# Patient Record
Sex: MALE | Race: WHITE
[De-identification: names, ages, dates, MRNs, and addresses within clinical notes are randomized per-mention and may not be internally consistent; named-entity substitution may affect disease eponyms.]

---

## 2017-05-19 ENCOUNTER — HOSPITAL ENCOUNTER (INPATIENT)
Dept: HOSPITAL 4 - SED | Age: 62
LOS: 1 days | Discharge: HOME | DRG: 313 | End: 2017-05-20
Payer: COMMERCIAL

## 2017-05-19 VITALS — HEIGHT: 74 IN | BODY MASS INDEX: 25.41 KG/M2 | WEIGHT: 198 LBS

## 2017-05-19 VITALS — SYSTOLIC BLOOD PRESSURE: 145 MMHG

## 2017-05-19 DIAGNOSIS — K22.70: ICD-10-CM

## 2017-05-19 DIAGNOSIS — F10.229: ICD-10-CM

## 2017-05-19 DIAGNOSIS — R07.89: Primary | ICD-10-CM

## 2017-05-19 DIAGNOSIS — I10: ICD-10-CM
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
## 2017-05-19 DIAGNOSIS — I25.2: ICD-10-CM

## 2017-05-19 DIAGNOSIS — Z79.899: ICD-10-CM

## 2017-05-19 DIAGNOSIS — Z81.1: ICD-10-CM

## 2017-05-19 LAB
ALBUMIN SERPL BCP-MCNC: 3.7 G/DL (ref 3.4–4.8)
ALT SERPL W P-5'-P-CCNC: 35 U/L (ref 12–78)
ANION GAP SERPL CALCULATED.3IONS-SCNC: 14 MMOL/L (ref 5–15)
APAP SERPL-MCNC: < 1 UG/ML (ref 1–30)
AST SERPL W P-5'-P-CCNC: 25 U/L (ref 10–37)
BASOPHILS # BLD AUTO: 0 K/UL (ref 0–0.2)
BASOPHILS NFR BLD AUTO: 0.6 % (ref 0–2)
BILIRUB SERPL-MCNC: 0.3 MG/DL (ref 0–1)
BUN SERPL-MCNC: 10 MG/DL (ref 8–21)
CALCIUM SERPL-MCNC: 8.6 MG/DL (ref 8.4–11)
CHLORIDE SERPL-SCNC: 104 MMOL/L (ref 98–107)
CK SERPL-CCNC: 94 U/L (ref 39–308)
CREAT SERPL-MCNC: 0.91 MG/DL (ref 0.55–1.3)
EOSINOPHIL # BLD AUTO: 0 K/UL (ref 0–0.4)
EOSINOPHIL NFR BLD AUTO: 0.7 % (ref 0–4)
ERYTHROCYTE [DISTWIDTH] IN BLOOD BY AUTOMATED COUNT: 18.7 % (ref 9–15)
ETHANOL SERPL-MCNC: 341 MG/DL (ref ?–10)
GFR SERPL CREATININE-BSD FRML MDRD: 109 ML/MIN (ref 90–?)
GLUCOSE SERPL-MCNC: 102 MG/DL (ref 70–99)
HCT VFR BLD AUTO: 41.6 % (ref 36–54)
HGB BLD-MCNC: 13 G/DL (ref 14–18)
INR PPP: 1 (ref 0.8–1.2)
LYMPHOCYTES # BLD AUTO: 1.6 K/UL (ref 1–5.5)
LYMPHOCYTES NFR BLD AUTO: 40.2 % (ref 20.5–51.5)
MCH RBC QN AUTO: 24 PG (ref 27–31)
MCHC RBC AUTO-ENTMCNC: 31 % (ref 32–36)
MCV RBC AUTO: 77 FL (ref 79–98)
MONOCYTES # BLD MANUAL: 0.6 K/UL (ref 0–1)
MONOCYTES # BLD MANUAL: 13.9 % (ref 1.7–9.3)
NEUTROPHILS # BLD AUTO: 1.9 K/UL (ref 1.8–7.7)
NEUTROPHILS NFR BLD AUTO: 44.6 % (ref 40–70)
PLATELET # BLD AUTO: 339 K/UL (ref 130–430)
POTASSIUM SERPL-SCNC: 3.3 MMOL/L (ref 3.5–5.1)
PROT SERPL-MCNC: 8.6 G/DL (ref 6.4–8.3)
PROTHROMBIN TIME: 10.8 SECS (ref 9.5–12.5)
RBC # BLD AUTO: 5.42 MIL/UL (ref 4.2–6.2)
SALICYLATES SERPL-MCNC: 1 MG/DL (ref 3–30)
SODIUM SERPLBLD-SCNC: 145 MMOL/L (ref 136–145)
WBC # BLD AUTO: 4.1 K/UL (ref 4.8–10.8)

## 2017-05-19 PROCEDURE — G0480 DRUG TEST DEF 1-7 CLASSES: HCPCS

## 2017-05-19 PROCEDURE — G0482 DRUG TEST DEF 15-21 CLASSES: HCPCS

## 2017-05-19 PROCEDURE — G0481 DRUG TEST DEF 8-14 CLASSES: HCPCS

## 2017-05-20 VITALS — SYSTOLIC BLOOD PRESSURE: 150 MMHG

## 2017-05-20 VITALS — SYSTOLIC BLOOD PRESSURE: 149 MMHG

## 2017-05-20 VITALS — SYSTOLIC BLOOD PRESSURE: 147 MMHG

## 2017-05-20 VITALS — SYSTOLIC BLOOD PRESSURE: 146 MMHG

## 2017-05-20 VITALS — SYSTOLIC BLOOD PRESSURE: 176 MMHG

## 2017-05-20 LAB
ALBUMIN SERPL BCP-MCNC: 3.5 G/DL (ref 3.4–4.8)
ALT SERPL W P-5'-P-CCNC: 32 U/L (ref 12–78)
ANION GAP SERPL CALCULATED.3IONS-SCNC: 14 MMOL/L (ref 5–15)
AST SERPL W P-5'-P-CCNC: 24 U/L (ref 10–37)
BASOPHILS # BLD AUTO: 0 K/UL (ref 0–0.2)
BASOPHILS NFR BLD AUTO: 1 % (ref 0–2)
BILIRUB SERPL-MCNC: 0.3 MG/DL (ref 0–1)
BUN SERPL-MCNC: 12 MG/DL (ref 8–21)
CALCIUM SERPL-MCNC: 8.5 MG/DL (ref 8.4–11)
CHLORIDE SERPL-SCNC: 103 MMOL/L (ref 98–107)
CREAT SERPL-MCNC: 0.83 MG/DL (ref 0.55–1.3)
EOSINOPHIL # BLD AUTO: 0.1 K/UL (ref 0–0.4)
EOSINOPHIL NFR BLD AUTO: 1.7 % (ref 0–4)
ERYTHROCYTE [DISTWIDTH] IN BLOOD BY AUTOMATED COUNT: 18.6 % (ref 9–15)
GFR SERPL CREATININE-BSD FRML MDRD: 121 ML/MIN (ref 90–?)
GLUCOSE SERPL-MCNC: 84 MG/DL (ref 70–99)
HCT VFR BLD AUTO: 40.5 % (ref 36–54)
HGB BLD-MCNC: 12.6 G/DL (ref 14–18)
LYMPHOCYTES # BLD AUTO: 1.1 K/UL (ref 1–5.5)
LYMPHOCYTES NFR BLD AUTO: 31.5 % (ref 20.5–51.5)
MCH RBC QN AUTO: 24 PG (ref 27–31)
MCHC RBC AUTO-ENTMCNC: 31 % (ref 32–36)
MCV RBC AUTO: 77 FL (ref 79–98)
MONOCYTES # BLD MANUAL: 0.5 K/UL (ref 0–1)
MONOCYTES # BLD MANUAL: 15.5 % (ref 1.7–9.3)
NEUTROPHILS # BLD AUTO: 1.8 K/UL (ref 1.8–7.7)
NEUTROPHILS NFR BLD AUTO: 50.3 % (ref 40–70)
PLATELET # BLD AUTO: 313 K/UL (ref 130–430)
POTASSIUM SERPL-SCNC: 3.4 MMOL/L (ref 3.5–5.1)
PROT SERPL-MCNC: 8.2 G/DL (ref 6.4–8.3)
RBC # BLD AUTO: 5.25 MIL/UL (ref 4.2–6.2)
SODIUM SERPLBLD-SCNC: 144 MMOL/L (ref 136–145)
WBC # BLD AUTO: 3.5 K/UL (ref 4.8–10.8)

## 2017-06-03 ENCOUNTER — HOSPITAL ENCOUNTER (EMERGENCY)
Dept: HOSPITAL 4 - SED | Age: 62
LOS: 1 days | Discharge: HOME | End: 2017-06-04
Payer: COMMERCIAL

## 2017-06-03 VITALS — WEIGHT: 210 LBS | BODY MASS INDEX: 25.57 KG/M2 | HEIGHT: 76 IN

## 2017-06-03 VITALS — SYSTOLIC BLOOD PRESSURE: 138 MMHG

## 2017-06-03 DIAGNOSIS — I25.2: ICD-10-CM

## 2017-06-03 DIAGNOSIS — R79.89: Primary | ICD-10-CM

## 2017-06-03 DIAGNOSIS — Z79.4: ICD-10-CM

## 2017-06-03 LAB
ALBUMIN SERPL BCP-MCNC: 3.9 G/DL (ref 3.4–4.8)
ALT SERPL W P-5'-P-CCNC: 33 U/L (ref 12–78)
ANION GAP SERPL CALCULATED.3IONS-SCNC: 15 MMOL/L (ref 5–15)
AST SERPL W P-5'-P-CCNC: 32 U/L (ref 10–37)
BASOPHILS # BLD AUTO: 0 K/UL (ref 0–0.2)
BASOPHILS NFR BLD AUTO: 0.7 % (ref 0–2)
BILIRUB SERPL-MCNC: 0.3 MG/DL (ref 0–1)
BUN SERPL-MCNC: 13 MG/DL (ref 8–21)
CALCIUM SERPL-MCNC: 8.7 MG/DL (ref 8.4–11)
CHLORIDE SERPL-SCNC: 101 MMOL/L (ref 98–107)
CREAT SERPL-MCNC: 1.1 MG/DL (ref 0.55–1.3)
EOSINOPHIL # BLD AUTO: 0 K/UL (ref 0–0.4)
EOSINOPHIL NFR BLD AUTO: 0.9 % (ref 0–4)
ERYTHROCYTE [DISTWIDTH] IN BLOOD BY AUTOMATED COUNT: 18.7 % (ref 9–15)
ETHANOL SERPL-MCNC: 207 MG/DL (ref ?–10)
GFR SERPL CREATININE-BSD FRML MDRD: 87 ML/MIN (ref 90–?)
GLUCOSE SERPL-MCNC: 149 MG/DL (ref 70–99)
HCT VFR BLD AUTO: 42.7 % (ref 36–54)
HGB BLD-MCNC: 13.5 G/DL (ref 14–18)
INR PPP: 1 (ref 0.8–1.2)
LYMPHOCYTES # BLD AUTO: 1.6 K/UL (ref 1–5.5)
LYMPHOCYTES NFR BLD AUTO: 38.1 % (ref 20.5–51.5)
MCH RBC QN AUTO: 24 PG (ref 27–31)
MCHC RBC AUTO-ENTMCNC: 32 % (ref 32–36)
MCV RBC AUTO: 77 FL (ref 79–98)
MONOCYTES # BLD MANUAL: 0.6 K/UL (ref 0–1)
MONOCYTES # BLD MANUAL: 12.8 % (ref 1.7–9.3)
NEUTROPHILS # BLD AUTO: 2.1 K/UL (ref 1.8–7.7)
NEUTROPHILS NFR BLD AUTO: 47.5 % (ref 40–70)
PLATELET # BLD AUTO: 322 K/UL (ref 130–430)
POTASSIUM SERPL-SCNC: 3.6 MMOL/L (ref 3.5–5.1)
PROT SERPL-MCNC: 8.8 G/DL (ref 6.4–8.3)
PROTHROMBIN TIME: 10.7 SECS (ref 9.5–12.5)
RBC # BLD AUTO: 5.57 MIL/UL (ref 4.2–6.2)
SODIUM SERPLBLD-SCNC: 139 MMOL/L (ref 136–145)
WBC # BLD AUTO: 4.3 K/UL (ref 4.8–10.8)

## 2017-06-03 PROCEDURE — 83880 ASSAY OF NATRIURETIC PEPTIDE: CPT

## 2017-06-03 PROCEDURE — 93005 ELECTROCARDIOGRAM TRACING: CPT

## 2017-06-03 PROCEDURE — 85610 PROTHROMBIN TIME: CPT

## 2017-06-03 PROCEDURE — 85730 THROMBOPLASTIN TIME PARTIAL: CPT

## 2017-06-03 PROCEDURE — 84484 ASSAY OF TROPONIN QUANT: CPT

## 2017-06-03 PROCEDURE — G0482 DRUG TEST DEF 15-21 CLASSES: HCPCS

## 2017-06-03 PROCEDURE — 85025 COMPLETE CBC W/AUTO DIFF WBC: CPT

## 2017-06-03 PROCEDURE — 96374 THER/PROPH/DIAG INJ IV PUSH: CPT

## 2017-06-03 PROCEDURE — 71010: CPT

## 2017-06-03 PROCEDURE — 99285 EMERGENCY DEPT VISIT HI MDM: CPT

## 2017-06-03 PROCEDURE — 80053 COMPREHEN METABOLIC PANEL: CPT

## 2017-06-03 PROCEDURE — 36415 COLL VENOUS BLD VENIPUNCTURE: CPT

## 2017-06-04 VITALS — SYSTOLIC BLOOD PRESSURE: 139 MMHG

## 2018-05-03 ENCOUNTER — HOSPITAL ENCOUNTER (EMERGENCY)
Dept: HOSPITAL 4 - SED | Age: 63
Discharge: LEFT BEFORE DISCHARGE | End: 2018-05-03
Payer: COMMERCIAL

## 2018-05-03 VITALS — HEIGHT: 76 IN | WEIGHT: 205 LBS | BODY MASS INDEX: 24.96 KG/M2

## 2018-05-03 VITALS — SYSTOLIC BLOOD PRESSURE: 120 MMHG

## 2018-05-03 DIAGNOSIS — Z79.899: ICD-10-CM
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
## 2018-05-03 DIAGNOSIS — K92.2: Primary | ICD-10-CM

## 2018-05-03 DIAGNOSIS — F10.129: ICD-10-CM

## 2018-05-03 DIAGNOSIS — Z53.20: ICD-10-CM

## 2018-05-03 DIAGNOSIS — Z79.82: ICD-10-CM

## 2018-05-03 DIAGNOSIS — I25.2: ICD-10-CM

## 2018-05-03 LAB
ALBUMIN SERPL BCP-MCNC: 3.2 G/DL (ref 3.4–4.8)
ALT SERPL W P-5'-P-CCNC: 76 U/L (ref 12–78)
ANION GAP SERPL CALCULATED.3IONS-SCNC: 10 MMOL/L (ref 5–15)
AST SERPL W P-5'-P-CCNC: 55 U/L (ref 10–37)
BASOPHILS # BLD AUTO: 0 K/UL (ref 0–0.2)
BASOPHILS NFR BLD AUTO: 0.6 % (ref 0–2)
BILIRUB SERPL-MCNC: 0.3 MG/DL (ref 0–1)
BUN SERPL-MCNC: 16 MG/DL (ref 8–21)
CALCIUM SERPL-MCNC: 8.9 MG/DL (ref 8.4–11)
CHLORIDE SERPL-SCNC: 99 MMOL/L (ref 98–107)
CREAT SERPL-MCNC: 0.84 MG/DL (ref 0.55–1.3)
EOSINOPHIL # BLD AUTO: 0 K/UL (ref 0–0.4)
EOSINOPHIL NFR BLD AUTO: 0.7 % (ref 0–4)
ERYTHROCYTE [DISTWIDTH] IN BLOOD BY AUTOMATED COUNT: 19.7 % (ref 9–15)
ETHANOL SERPL-MCNC: 201 MG/DL (ref ?–10)
GFR SERPL CREATININE-BSD FRML MDRD: 119 ML/MIN (ref 90–?)
GLUCOSE SERPL-MCNC: 150 MG/DL (ref 70–99)
HCT VFR BLD AUTO: 40 % (ref 36–54)
HGB BLD-MCNC: 12.8 G/DL (ref 14–18)
INR PPP: 1 (ref 0.8–1.2)
LIPASE SERPL-CCNC: 247 U/L (ref 73–393)
LYMPHOCYTES # BLD AUTO: 1.3 K/UL (ref 1–5.5)
LYMPHOCYTES NFR BLD AUTO: 20.5 % (ref 20.5–51.5)
MCH RBC QN AUTO: 24 PG (ref 27–31)
MCHC RBC AUTO-ENTMCNC: 32 % (ref 32–36)
MCV RBC AUTO: 73 FL (ref 79–98)
MONOCYTES # BLD MANUAL: 0.7 K/UL (ref 0–1)
MONOCYTES # BLD MANUAL: 11.2 % (ref 1.7–9.3)
NEUTROPHILS # BLD AUTO: 4.3 K/UL (ref 1.8–7.7)
NEUTROPHILS NFR BLD AUTO: 67 % (ref 40–70)
PLATELET # BLD AUTO: 241 K/UL (ref 130–430)
POTASSIUM SERPL-SCNC: 2.7 MMOL/L (ref 3.5–5.1)
PROTHROMBIN TIME: 10.2 SECS (ref 9.5–12.5)
RBC # BLD AUTO: 5.46 MIL/UL (ref 4.2–6.2)
SODIUM SERPLBLD-SCNC: 140 MMOL/L (ref 136–145)
WBC # BLD AUTO: 6.3 K/UL (ref 4.8–10.8)

## 2018-05-03 PROCEDURE — 85610 PROTHROMBIN TIME: CPT

## 2018-05-03 PROCEDURE — 85025 COMPLETE CBC W/AUTO DIFF WBC: CPT

## 2018-05-03 PROCEDURE — 83690 ASSAY OF LIPASE: CPT

## 2018-05-03 PROCEDURE — 36415 COLL VENOUS BLD VENIPUNCTURE: CPT

## 2018-05-03 PROCEDURE — 85730 THROMBOPLASTIN TIME PARTIAL: CPT

## 2018-05-03 PROCEDURE — 99284 EMERGENCY DEPT VISIT MOD MDM: CPT

## 2018-05-03 PROCEDURE — G0482 DRUG TEST DEF 15-21 CLASSES: HCPCS

## 2018-05-03 PROCEDURE — 80053 COMPREHEN METABOLIC PANEL: CPT

## 2018-05-04 ENCOUNTER — HOSPITAL ENCOUNTER (INPATIENT)
Dept: HOSPITAL 4 - SED | Age: 63
LOS: 1 days | Discharge: LEFT BEFORE DISCHARGE | DRG: 378 | End: 2018-05-05
Attending: INTERNAL MEDICINE | Admitting: INTERNAL MEDICINE
Payer: COMMERCIAL

## 2018-05-04 VITALS — SYSTOLIC BLOOD PRESSURE: 131 MMHG

## 2018-05-04 VITALS — HEIGHT: 76 IN | WEIGHT: 205 LBS | BODY MASS INDEX: 24.96 KG/M2

## 2018-05-04 DIAGNOSIS — G62.9: ICD-10-CM

## 2018-05-04 DIAGNOSIS — K92.2: Primary | ICD-10-CM

## 2018-05-04 DIAGNOSIS — K22.70: ICD-10-CM

## 2018-05-04 DIAGNOSIS — I47.1: ICD-10-CM

## 2018-05-04 DIAGNOSIS — F32.9: ICD-10-CM

## 2018-05-04 DIAGNOSIS — Z81.1: ICD-10-CM

## 2018-05-04 DIAGNOSIS — F10.20: ICD-10-CM

## 2018-05-04 DIAGNOSIS — D50.0: ICD-10-CM

## 2018-05-04 DIAGNOSIS — Z53.21: ICD-10-CM

## 2018-05-04 DIAGNOSIS — I25.10: ICD-10-CM

## 2018-05-04 LAB
ALBUMIN SERPL BCP-MCNC: 3 G/DL (ref 3.4–4.8)
ALT SERPL W P-5'-P-CCNC: 56 U/L (ref 12–78)
ANION GAP SERPL CALCULATED.3IONS-SCNC: 9 MMOL/L (ref 5–15)
AST SERPL W P-5'-P-CCNC: 32 U/L (ref 10–37)
BASOPHILS # BLD AUTO: 0 K/UL (ref 0–0.2)
BASOPHILS NFR BLD AUTO: 0.3 % (ref 0–2)
BILIRUB SERPL-MCNC: 0.5 MG/DL (ref 0–1)
BUN SERPL-MCNC: 31 MG/DL (ref 8–21)
CALCIUM SERPL-MCNC: 8.7 MG/DL (ref 8.4–11)
CHLORIDE SERPL-SCNC: 96 MMOL/L (ref 98–107)
CREAT SERPL-MCNC: 0.98 MG/DL (ref 0.55–1.3)
EOSINOPHIL # BLD AUTO: 0.1 K/UL (ref 0–0.4)
EOSINOPHIL NFR BLD AUTO: 1.1 % (ref 0–4)
ERYTHROCYTE [DISTWIDTH] IN BLOOD BY AUTOMATED COUNT: 19 % (ref 9–15)
ETHANOL SERPL-MCNC: < 3 MG/DL (ref ?–10)
GFR SERPL CREATININE-BSD FRML MDRD: 99 ML/MIN (ref 90–?)
GLUCOSE SERPL-MCNC: 163 MG/DL (ref 70–99)
HCT VFR BLD AUTO: 33 % (ref 36–54)
HGB BLD-MCNC: 10.8 G/DL (ref 14–18)
LIPASE SERPL-CCNC: 213 U/L (ref 73–393)
LYMPHOCYTES # BLD AUTO: 1.5 K/UL (ref 1–5.5)
LYMPHOCYTES NFR BLD AUTO: 17.8 % (ref 20.5–51.5)
MCH RBC QN AUTO: 24 PG (ref 27–31)
MCHC RBC AUTO-ENTMCNC: 33 % (ref 32–36)
MCV RBC AUTO: 74 FL (ref 79–98)
MONOCYTES # BLD MANUAL: 0.6 K/UL (ref 0–1)
MONOCYTES # BLD MANUAL: 7 % (ref 1.7–9.3)
NEUTROPHILS # BLD AUTO: 6 K/UL (ref 1.8–7.7)
NEUTROPHILS NFR BLD AUTO: 73.8 % (ref 40–70)
PLATELET # BLD AUTO: 226 K/UL (ref 130–430)
POTASSIUM SERPL-SCNC: 2.6 MMOL/L (ref 3.5–5.1)
RBC # BLD AUTO: 4.46 MIL/UL (ref 4.2–6.2)
SODIUM SERPLBLD-SCNC: 137 MMOL/L (ref 136–145)
WBC # BLD AUTO: 8.2 K/UL (ref 4.8–10.8)

## 2018-05-04 PROCEDURE — G0482 DRUG TEST DEF 15-21 CLASSES: HCPCS

## 2018-05-04 PROCEDURE — C9113 INJ PANTOPRAZOLE SODIUM, VIA: HCPCS

## 2018-05-05 VITALS — SYSTOLIC BLOOD PRESSURE: 117 MMHG

## 2018-05-05 VITALS — SYSTOLIC BLOOD PRESSURE: 109 MMHG

## 2018-05-05 LAB
ALBUMIN SERPL BCP-MCNC: 2.4 G/DL (ref 3.4–4.8)
ALT SERPL W P-5'-P-CCNC: 42 U/L (ref 12–78)
ANION GAP SERPL CALCULATED.3IONS-SCNC: 5 MMOL/L (ref 5–15)
AST SERPL W P-5'-P-CCNC: 24 U/L (ref 10–37)
BASOPHILS # BLD AUTO: 0 K/UL (ref 0–0.2)
BASOPHILS NFR BLD AUTO: 0.4 % (ref 0–2)
BILIRUB SERPL-MCNC: 0.4 MG/DL (ref 0–1)
BUN SERPL-MCNC: 41 MG/DL (ref 8–21)
CALCIUM SERPL-MCNC: 7.9 MG/DL (ref 8.4–11)
CHLORIDE SERPL-SCNC: 104 MMOL/L (ref 98–107)
CREAT SERPL-MCNC: 0.67 MG/DL (ref 0.55–1.3)
EOSINOPHIL # BLD AUTO: 0.1 K/UL (ref 0–0.4)
EOSINOPHIL NFR BLD AUTO: 1.9 % (ref 0–4)
ERYTHROCYTE [DISTWIDTH] IN BLOOD BY AUTOMATED COUNT: 19.3 % (ref 9–15)
GFR SERPL CREATININE-BSD FRML MDRD: 154 ML/MIN (ref 90–?)
GLUCOSE SERPL-MCNC: 102 MG/DL (ref 70–99)
HCT VFR BLD AUTO: 27.1 % (ref 36–54)
HGB BLD-MCNC: 8.7 G/DL (ref 14–18)
INR PPP: 1.1 (ref 0.8–1.2)
LYMPHOCYTES # BLD AUTO: 1.2 K/UL (ref 1–5.5)
LYMPHOCYTES NFR BLD AUTO: 18.2 % (ref 20.5–51.5)
MCH RBC QN AUTO: 24 PG (ref 27–31)
MCHC RBC AUTO-ENTMCNC: 32 % (ref 32–36)
MCV RBC AUTO: 75 FL (ref 79–98)
MONOCYTES # BLD MANUAL: 0.5 K/UL (ref 0–1)
MONOCYTES # BLD MANUAL: 7.3 % (ref 1.7–9.3)
NEUTROPHILS # BLD AUTO: 4.5 K/UL (ref 1.8–7.7)
NEUTROPHILS NFR BLD AUTO: 72.2 % (ref 40–70)
PLATELET # BLD AUTO: 188 K/UL (ref 130–430)
POTASSIUM SERPL-SCNC: 3.6 MMOL/L (ref 3.5–5.1)
PROTHROMBIN TIME: 11 SECS (ref 9.5–12.5)
RBC # BLD AUTO: 3.61 MIL/UL (ref 4.2–6.2)
SODIUM SERPLBLD-SCNC: 140 MMOL/L (ref 136–145)
WBC # BLD AUTO: 6.3 K/UL (ref 4.8–10.8)

## 2018-05-05 RX ADMIN — SODIUM CHLORIDE SCH MG: 9 INJECTION, SOLUTION INTRAVENOUS at 08:58

## 2018-05-05 RX ADMIN — SODIUM CHLORIDE SCH MG: 9 INJECTION, SOLUTION INTRAVENOUS at 01:53

## 2018-05-05 RX ADMIN — SODIUM CHLORIDE SCH MLS/HR: 9 INJECTION, SOLUTION INTRAVENOUS at 08:58

## 2018-05-05 RX ADMIN — SODIUM CHLORIDE SCH MLS/HR: 9 INJECTION, SOLUTION INTRAVENOUS at 03:55

## 2018-05-07 ENCOUNTER — HOSPITAL ENCOUNTER (INPATIENT)
Dept: HOSPITAL 4 - SED | Age: 63
LOS: 2 days | Discharge: HOME | DRG: 381 | End: 2018-05-09
Attending: INTERNAL MEDICINE | Admitting: INTERNAL MEDICINE
Payer: COMMERCIAL

## 2018-05-07 VITALS — BODY MASS INDEX: 24.48 KG/M2 | WEIGHT: 201 LBS | HEIGHT: 76 IN

## 2018-05-07 VITALS — SYSTOLIC BLOOD PRESSURE: 123 MMHG

## 2018-05-07 DIAGNOSIS — K29.71: ICD-10-CM

## 2018-05-07 DIAGNOSIS — E87.6: ICD-10-CM

## 2018-05-07 DIAGNOSIS — F10.239: ICD-10-CM

## 2018-05-07 DIAGNOSIS — Z79.82: ICD-10-CM

## 2018-05-07 DIAGNOSIS — K22.11: Primary | ICD-10-CM

## 2018-05-07 DIAGNOSIS — Z79.899: ICD-10-CM

## 2018-05-07 DIAGNOSIS — D64.9: ICD-10-CM

## 2018-05-07 LAB
ALBUMIN SERPL BCP-MCNC: 3 G/DL (ref 3.4–4.8)
ALT SERPL W P-5'-P-CCNC: 46 U/L (ref 12–78)
ANION GAP SERPL CALCULATED.3IONS-SCNC: 11 MMOL/L (ref 5–15)
AST SERPL W P-5'-P-CCNC: 27 U/L (ref 10–37)
BASOPHILS # BLD AUTO: 0 K/UL (ref 0–0.2)
BASOPHILS NFR BLD AUTO: 0.5 % (ref 0–2)
BILIRUB SERPL-MCNC: 0.2 MG/DL (ref 0–1)
BUN SERPL-MCNC: 15 MG/DL (ref 8–21)
CALCIUM SERPL-MCNC: 8.7 MG/DL (ref 8.4–11)
CHLORIDE SERPL-SCNC: 101 MMOL/L (ref 98–107)
CREAT SERPL-MCNC: 0.95 MG/DL (ref 0.55–1.3)
EOSINOPHIL # BLD AUTO: 0.1 K/UL (ref 0–0.4)
EOSINOPHIL NFR BLD AUTO: 1.7 % (ref 0–4)
ERYTHROCYTE [DISTWIDTH] IN BLOOD BY AUTOMATED COUNT: 19 % (ref 9–15)
ETHANOL SERPL-MCNC: < 3 MG/DL (ref ?–10)
GFR SERPL CREATININE-BSD FRML MDRD: 103 ML/MIN (ref 90–?)
GLUCOSE SERPL-MCNC: 122 MG/DL (ref 70–99)
HCT VFR BLD AUTO: 20.7 % (ref 36–54)
HGB BLD-MCNC: 6.7 G/DL (ref 14–18)
INR PPP: 1 (ref 0.8–1.2)
LYMPHOCYTES # BLD AUTO: 1.2 K/UL (ref 1–5.5)
LYMPHOCYTES NFR BLD AUTO: 21.1 % (ref 20.5–51.5)
MCH RBC QN AUTO: 25 PG (ref 27–31)
MCHC RBC AUTO-ENTMCNC: 33 % (ref 32–36)
MCV RBC AUTO: 78 FL (ref 79–98)
MONOCYTES # BLD MANUAL: 0.5 K/UL (ref 0–1)
MONOCYTES # BLD MANUAL: 9.9 % (ref 1.7–9.3)
NEUTROPHILS # BLD AUTO: 3.7 K/UL (ref 1.8–7.7)
NEUTROPHILS NFR BLD AUTO: 66.8 % (ref 40–70)
PLATELET # BLD AUTO: 215 K/UL (ref 130–430)
POTASSIUM SERPL-SCNC: 2.8 MMOL/L (ref 3.5–5.1)
PROTHROMBIN TIME: 10.1 SECS (ref 9.5–12.5)
RBC # BLD AUTO: 2.65 MIL/UL (ref 4.2–6.2)
SODIUM SERPLBLD-SCNC: 140 MMOL/L (ref 136–145)
WBC # BLD AUTO: 5.5 K/UL (ref 4.8–10.8)

## 2018-05-07 PROCEDURE — G0482 DRUG TEST DEF 15-21 CLASSES: HCPCS

## 2018-05-07 PROCEDURE — C9113 INJ PANTOPRAZOLE SODIUM, VIA: HCPCS

## 2018-05-07 PROCEDURE — P9021 RED BLOOD CELLS UNIT: HCPCS

## 2018-05-07 NOTE — NUR
Pt AAOx4 presents to ED c/o L side chest pain with L arm tingling and SOB. Pt 
states he had 1 syncopal episode of cofee ground emesis today. Pt recently 
admitted 2 days ago for elevated troponin and left AMA. Pt has hx of 
alcoholism. Pt skin pink warm and dry, breathing even and unlabored. No other 
injuries/complaints per pt/noted. Will continue to monitor.

## 2018-05-08 VITALS — SYSTOLIC BLOOD PRESSURE: 104 MMHG

## 2018-05-08 VITALS — SYSTOLIC BLOOD PRESSURE: 106 MMHG

## 2018-05-08 VITALS — SYSTOLIC BLOOD PRESSURE: 126 MMHG

## 2018-05-08 VITALS — SYSTOLIC BLOOD PRESSURE: 123 MMHG

## 2018-05-08 VITALS — SYSTOLIC BLOOD PRESSURE: 112 MMHG

## 2018-05-08 VITALS — SYSTOLIC BLOOD PRESSURE: 133 MMHG

## 2018-05-08 VITALS — SYSTOLIC BLOOD PRESSURE: 130 MMHG

## 2018-05-08 VITALS — SYSTOLIC BLOOD PRESSURE: 91 MMHG

## 2018-05-08 VITALS — SYSTOLIC BLOOD PRESSURE: 128 MMHG

## 2018-05-08 VITALS — SYSTOLIC BLOOD PRESSURE: 110 MMHG

## 2018-05-08 VITALS — SYSTOLIC BLOOD PRESSURE: 125 MMHG

## 2018-05-08 VITALS — SYSTOLIC BLOOD PRESSURE: 127 MMHG

## 2018-05-08 VITALS — SYSTOLIC BLOOD PRESSURE: 135 MMHG

## 2018-05-08 VITALS — SYSTOLIC BLOOD PRESSURE: 90 MMHG

## 2018-05-08 VITALS — SYSTOLIC BLOOD PRESSURE: 118 MMHG

## 2018-05-08 VITALS — SYSTOLIC BLOOD PRESSURE: 115 MMHG

## 2018-05-08 VITALS — SYSTOLIC BLOOD PRESSURE: 120 MMHG

## 2018-05-08 LAB
ANION GAP SERPL CALCULATED.3IONS-SCNC: 3 MMOL/L (ref 5–15)
BUN SERPL-MCNC: 10 MG/DL (ref 8–21)
CALCIUM SERPL-MCNC: 8 MG/DL (ref 8.4–11)
CHLORIDE SERPL-SCNC: 105 MMOL/L (ref 98–107)
CREAT SERPL-MCNC: 0.79 MG/DL (ref 0.55–1.3)
GFR SERPL CREATININE-BSD FRML MDRD: 127 ML/MIN (ref 90–?)
GLUCOSE SERPL-MCNC: 160 MG/DL (ref 70–99)
HCT VFR BLD AUTO: 21.7 % (ref 36–54)
HCT VFR BLD AUTO: 23 % (ref 36–54)
HCT VFR BLD AUTO: 23.3 % (ref 36–54)
HGB BLD-MCNC: 7.1 G/DL (ref 14–18)
HGB BLD-MCNC: 7.5 G/DL (ref 14–18)
HGB BLD-MCNC: 7.8 G/DL (ref 14–18)
POTASSIUM SERPL-SCNC: 3.8 MMOL/L (ref 3.5–5.1)
SODIUM SERPLBLD-SCNC: 138 MMOL/L (ref 136–145)

## 2018-05-08 PROCEDURE — 0DB38ZX EXCISION OF LOWER ESOPHAGUS, VIA NATURAL OR ARTIFICIAL OPENING ENDOSCOPIC, DIAGNOSTIC: ICD-10-PCS | Performed by: INTERNAL MEDICINE

## 2018-05-08 PROCEDURE — 0DB68ZX EXCISION OF STOMACH, VIA NATURAL OR ARTIFICIAL OPENING ENDOSCOPIC, DIAGNOSTIC: ICD-10-PCS | Performed by: INTERNAL MEDICINE

## 2018-05-08 PROCEDURE — 30233N1 TRANSFUSION OF NONAUTOLOGOUS RED BLOOD CELLS INTO PERIPHERAL VEIN, PERCUTANEOUS APPROACH: ICD-10-PCS | Performed by: INTERNAL MEDICINE

## 2018-05-08 RX ADMIN — DEXTROSE AND SODIUM CHLORIDE SCH MLS/HR: 5; 450 INJECTION, SOLUTION INTRAVENOUS at 22:37

## 2018-05-08 RX ADMIN — SODIUM CHLORIDE SCH MG: 9 INJECTION, SOLUTION INTRAVENOUS at 01:49

## 2018-05-08 RX ADMIN — FENTANYL CITRATE ONE MCG: 50 INJECTION, SOLUTION INTRAMUSCULAR; INTRAVENOUS at 14:19

## 2018-05-08 RX ADMIN — MIDAZOLAM HYDROCHLORIDE ONE MG: 1 INJECTION, SOLUTION INTRAMUSCULAR; INTRAVENOUS at 14:21

## 2018-05-08 RX ADMIN — FENTANYL CITRATE ONE MCG: 50 INJECTION, SOLUTION INTRAMUSCULAR; INTRAVENOUS at 14:49

## 2018-05-08 RX ADMIN — SODIUM CHLORIDE SCH MG: 9 INJECTION, SOLUTION INTRAVENOUS at 09:05

## 2018-05-08 RX ADMIN — MIDAZOLAM HYDROCHLORIDE ONE MG: 1 INJECTION, SOLUTION INTRAMUSCULAR; INTRAVENOUS at 14:50

## 2018-05-08 RX ADMIN — DEXTROSE AND SODIUM CHLORIDE SCH MLS/HR: 5; 450 INJECTION, SOLUTION INTRAVENOUS at 01:50

## 2018-05-08 RX ADMIN — MIDAZOLAM HYDROCHLORIDE ONE MG: 1 INJECTION, SOLUTION INTRAMUSCULAR; INTRAVENOUS at 14:19

## 2018-05-08 RX ADMIN — FENTANYL CITRATE ONE MCG: 50 INJECTION, SOLUTION INTRAMUSCULAR; INTRAVENOUS at 14:23

## 2018-05-08 RX ADMIN — DEXTROSE AND SODIUM CHLORIDE SCH MLS/HR: 5; 450 INJECTION, SOLUTION INTRAVENOUS at 14:50

## 2018-05-08 RX ADMIN — SODIUM CHLORIDE SCH MLS/HR: 9 INJECTION, SOLUTION INTRAVENOUS at 20:49

## 2018-05-08 RX ADMIN — MIDAZOLAM HYDROCHLORIDE ONE MG: 1 INJECTION, SOLUTION INTRAMUSCULAR; INTRAVENOUS at 14:23

## 2018-05-08 RX ADMIN — SODIUM CHLORIDE SCH MLS/HR: 9 INJECTION, SOLUTION INTRAVENOUS at 07:18

## 2018-05-08 RX ADMIN — DEXTROSE AND SODIUM CHLORIDE SCH MLS/HR: 5; 450 INJECTION, SOLUTION INTRAVENOUS at 09:04

## 2018-05-08 RX ADMIN — FENTANYL CITRATE ONE MCG: 50 INJECTION, SOLUTION INTRAMUSCULAR; INTRAVENOUS at 14:21

## 2018-05-08 RX ADMIN — MIDAZOLAM HYDROCHLORIDE ONE MG: 1 INJECTION, SOLUTION INTRAMUSCULAR; INTRAVENOUS at 14:25

## 2018-05-08 NOTE — NUR
PATIENT RESTING:

Patient resting quietly. No acute distress noted. Vital signs within normal range. Discussed 
with patient plan of care, patient aware of findings on EGD. Patient encouraged to call if 
needs arise. Will continue to follow up and monitor patient for changes in status. 
Sandostatin drip per md orders. Patient has IVF running per MD orders.

## 2018-05-08 NOTE — NUR
PATIENT RESTING:

Patient resting quietly. IVF running per md orders. Sandostatin as ordered. No acute 
distress noted. Vital signs within normal range. Patient pending EGD, aware of pending 
procedure. Will continue to follow up and monitor.

## 2018-05-08 NOTE — NUR
OPENING NOTE

PATIENT RESTING COMFORTABLY. VITAL SIGNS STABLE. PATIENT HAVING BANANA BAG, STARTED BY PM 
SHIFT NURSE. PATIENT HAS SANDOSTATIN DRIP AT 12.5. PATIENT IS ON ROOM AIR, 98%. PATIENT 
AFEBRILE. PATIENT ENCOURAGED TO CALL IF NEEDS ARISE. PATIENT HAS LOW H/H WILL NOTIFY MD OF 
CRITICAL LAB. PATIENT HAD 2 PRBC UNITS DURING PM SHIFT. PATIENT ENCOURAGED TO CALL IF NEEDS 
ARISE. PATIENT HAS URINAL AT BEDSIDE FOR EASE OF USE. WILL CONTINUE TO MONITOR PATIENT FOR 
CHANGES IN STATUS.

## 2018-05-08 NOTE — NUR
Note

Patient resting comfortably. Needs have been met at this time. Patient aware of pending 
procedure, EGD. Patient having mild anxiety, attempting to keep room quiet, calm, and 
promoting rest. Will continue to follow up and monitor patient for changes in status.

## 2018-05-08 NOTE — NUR
NOTE

PATIENT RESTING COMFORTABLY, HE HAS COMPLAINTS OF PAIN OF HEADACHE, AND ACID REFLUX. PATIENT 
AWARE OF PROTONIX AS MORNING MEDICATIONS. PATIENT ENCOURAGED TO REST, CURTAIN CLOSED AND 
SHADE ARE DRAWN FOR LIGHT DIM. PATIENT PLAN OF CARE DISCUSSED, PATIENT AWARE OF LOW H/H. 
PATIENT VITAL SIGNS ARE STABLE.

## 2018-05-08 NOTE — NUR
CHELSEA TRAN RETURNED CALL. OKAY TO DISCONTINUE SANDOSTATIN DRIP. START PROTONIX DRIP, ORDERS TO 
BE ENTERED BY NURSE. WILL CONTINUE TO FOLLOW UP AND MONITOR.

## 2018-05-08 NOTE — NUR
CONSENT

CONSENT WAS SIGNED BY PATIENT FOR EGD WITH DR. TRAN. KNOWS PROCEDURE MAY TAKE PLACE TODAY 
AND NPO STATUS IS CONTINUED. 

BED CONTROL, BARRY MADE AWARE OF POSSIBLE EGD TODAY.

## 2018-05-08 NOTE — NUR
Patient will be admitted to care of Dr. Her.  Admitted to intensive care 
unit.  Will go to bed 3.  Belongings list completed.  Summary report printed. 
Report will be given at bedside.

## 2018-05-08 NOTE — NUR
Dr Her

Received phone call from Dr Her inquiring about pt condition. Updated MD on pt 
condition. Instructed by MD to bolus 2nd unit of PRBCs within 1 hour.

## 2018-05-08 NOTE — NUR
CHELSEA

SPOKE WITH DR. TRAN VIA PHONE. AWARE OF LOW H/H. PATIENT TO HAVE CONSENT SIGNED FOR EGD. 
PATIENT MADE AWARE OF RETURN CALL, AND PENDING CONSENT SIGNATURE.

## 2018-05-08 NOTE — NUR
ADMISSION NOTE

Received patient from ER via gurbriana. Pt placed in ICU 3. Report received from JAMILA Jones. 
Patient admitted with diagnosis of GI Bleed. Patient is awake, alert, oriented X 3. Patient 
oriented to hospital room, call light, toileting, pain management and safety-teach back 
done. Personal belongings checked and Belongings List documented. Call light within reach.

## 2018-05-08 NOTE — NUR
NOTE

AWAITING TO GIVE REPORT TO NIGHT SHIFT NURSE. PATIENT SITTING UPRIGHT FOR DINNER. CLEAR 
LIQUID DIET, NO REDS. PATIENT ENCOURAGED TO CALL IF NEEDS ARISE. PATIENT VITALS ARE STABLE 
AT THIS TIME.

## 2018-05-08 NOTE — NUR
Blood transfusion

Crossmatched blood per facility protocol. 1 unit PRBC initiated, will monitor for adverse 
reaction.

## 2018-05-08 NOTE — NUR
2nd unit PRBC

Crossmatched blood per facility protocol. Initiated 2nd unit of PRBCs. Will monitor for 
adverse reactions.

## 2018-05-08 NOTE — NUR
Beginning of shift assessment

Pt lying in bed AA&Ox3, able to make needs known able to follow commands. Respirations even 
and unlabored. Pt c/o 3/10 headache. 20 g IV to L FA, SL. 20 g IV to R FA infusing IVF. No 
s/s of infection or infiltration. Pt voids to urinal. 400 ml of clear yellow urine noted. 
Call light within reach, bed in low position for safety. Will continue to monitor

## 2018-05-08 NOTE — NUR
Transfer to telemetry via ACLS protocol. Licensed nurse present. IV present no 
signs or symptoms of infiltration.

## 2018-05-09 VITALS — SYSTOLIC BLOOD PRESSURE: 98 MMHG

## 2018-05-09 VITALS — SYSTOLIC BLOOD PRESSURE: 133 MMHG

## 2018-05-09 VITALS — SYSTOLIC BLOOD PRESSURE: 132 MMHG

## 2018-05-09 VITALS — SYSTOLIC BLOOD PRESSURE: 127 MMHG

## 2018-05-09 VITALS — SYSTOLIC BLOOD PRESSURE: 134 MMHG

## 2018-05-09 VITALS — SYSTOLIC BLOOD PRESSURE: 136 MMHG

## 2018-05-09 VITALS — SYSTOLIC BLOOD PRESSURE: 112 MMHG

## 2018-05-09 VITALS — SYSTOLIC BLOOD PRESSURE: 115 MMHG

## 2018-05-09 VITALS — SYSTOLIC BLOOD PRESSURE: 99 MMHG

## 2018-05-09 VITALS — SYSTOLIC BLOOD PRESSURE: 117 MMHG

## 2018-05-09 VITALS — SYSTOLIC BLOOD PRESSURE: 114 MMHG

## 2018-05-09 VITALS — SYSTOLIC BLOOD PRESSURE: 108 MMHG

## 2018-05-09 VITALS — SYSTOLIC BLOOD PRESSURE: 121 MMHG

## 2018-05-09 VITALS — SYSTOLIC BLOOD PRESSURE: 95 MMHG

## 2018-05-09 VITALS — SYSTOLIC BLOOD PRESSURE: 111 MMHG

## 2018-05-09 VITALS — SYSTOLIC BLOOD PRESSURE: 151 MMHG

## 2018-05-09 LAB
HCT VFR BLD AUTO: 24.2 % (ref 36–54)
HCT VFR BLD AUTO: 24.8 % (ref 36–54)
HGB BLD-MCNC: 8 G/DL (ref 14–18)
HGB BLD-MCNC: 8.2 G/DL (ref 14–18)

## 2018-05-09 RX ADMIN — SODIUM CHLORIDE SCH MLS/HR: 9 INJECTION, SOLUTION INTRAVENOUS at 01:30

## 2018-05-09 RX ADMIN — DEXTROSE AND SODIUM CHLORIDE SCH MLS/HR: 5; 450 INJECTION, SOLUTION INTRAVENOUS at 09:16

## 2018-05-09 RX ADMIN — SODIUM CHLORIDE SCH MLS/HR: 9 INJECTION, SOLUTION INTRAVENOUS at 06:14

## 2018-05-09 RX ADMIN — SODIUM CHLORIDE SCH MLS/HR: 9 INJECTION, SOLUTION INTRAVENOUS at 11:00

## 2018-05-09 RX ADMIN — SODIUM CHLORIDE SCH MLS/HR: 9 INJECTION, SOLUTION INTRAVENOUS at 05:17

## 2018-05-09 NOTE — NUR
D/C Patient

Patient given medication reconciliation form and D/C instructions. Exit Care provided. 
Patient verbalized understanding. MD discussed with patient the results and treatment 
provided. Ambulatory with steady gait for discharge to home. Patient in stable condition, ID 
band removed. IV catheter removed, intact and dressing applied, no active bleeding. Rx of 
protonix given. Patient educated on pain management. All belongings sent with patient.

## 2018-05-09 NOTE — NUR
DC planning: Reviewed LCSW note--plan is for discharge home from ICU--ICU nurse Carolyn ALTAMIRANO 
aware of dc plan--will f/u as needed--SILVER MARINO

## 2018-05-09 NOTE — NUR
Rounds

Pt AA&Ox3, able to make needs known. Respirations even and unlabored. No acute changes in 
condition noted at this time.

## 2018-05-09 NOTE — NUR
Social Service Note:

LCSW met with pt at bedside; pt is known to LCSW from previous admission. Pt states that he 
recently left sober living after being in sober living for 9 months. Pt states that he moved 
back home and started drinking vodka again. Pt states that he did not give himself time to 
set up a support network. LCSW spoke with pt about attending AA meetings and/or an 
outpatient program. Pt appears open and willing to build a support network to maintain 
sobriety. LCSW provided pt with a listing of AA meetings for [place] and Substance Abuse 
Treatment Providers. LCSW will remain available for support and will follow up as needed.

## 2018-05-10 NOTE — NUR
Discharge Follow Up Phone Call

LCSW phoned patient, [phone]. Patient stated he was doing okay. He did not fill his 
prescription because he has omeprazole at home already. He has a call in to his PCP, Dr Rodriguez, for a follow up appointment. He will call the GI tomorrow. LCSW offered to assist, 
but patient preferred to make his own appointment. Patient state he did not look for AA 
meetings yet. Discussed. Patient is reaching out to friend and family for support. Patient 
is aware of the sever consequences to his health of drinking again. Discussed possible 
marriage counseling. Patient will consider. Patient stated he was surprised to be discharged 
from the ICU but that he was managing okay. He had no other questions or concerns.

## 2021-12-20 ENCOUNTER — HOSPITAL ENCOUNTER (EMERGENCY)
Dept: HOSPITAL 4 - SED | Age: 66
Discharge: HOME | End: 2021-12-20
Payer: COMMERCIAL

## 2021-12-20 VITALS — SYSTOLIC BLOOD PRESSURE: 146 MMHG

## 2021-12-20 VITALS — BODY MASS INDEX: 25.71 KG/M2 | WEIGHT: 160 LBS | HEIGHT: 66 IN

## 2021-12-20 VITALS — SYSTOLIC BLOOD PRESSURE: 126 MMHG

## 2021-12-20 DIAGNOSIS — Z79.899: ICD-10-CM

## 2021-12-20 DIAGNOSIS — R46.89: ICD-10-CM

## 2021-12-20 DIAGNOSIS — G47.00: Primary | ICD-10-CM

## 2021-12-20 LAB
ALBUMIN SERPL BCP-MCNC: 3.9 G/DL (ref 3.4–4.8)
ALT SERPL W P-5'-P-CCNC: 24 U/L (ref 12–78)
ANION GAP SERPL CALCULATED.3IONS-SCNC: 14 MMOL/L (ref 5–15)
APAP SERPL-MCNC: < 1 UG/ML (ref 1–30)
AST SERPL W P-5'-P-CCNC: 17 U/L (ref 10–37)
BASOPHILS # BLD AUTO: 0 K/UL (ref 0–0.2)
BASOPHILS NFR BLD AUTO: 0.3 % (ref 0–2)
BILIRUB SERPL-MCNC: 0.4 MG/DL (ref 0–1)
BUN SERPL-MCNC: 18 MG/DL (ref 8–21)
CALCIUM SERPL-MCNC: 9.3 MG/DL (ref 8.4–11)
CHLORIDE SERPL-SCNC: 104 MMOL/L (ref 98–107)
CREAT SERPL-MCNC: 0.9 MG/DL (ref 0.55–1.3)
EOSINOPHIL # BLD AUTO: 0 K/UL (ref 0–0.4)
EOSINOPHIL NFR BLD AUTO: 0 % (ref 0–4)
ERYTHROCYTE [DISTWIDTH] IN BLOOD BY AUTOMATED COUNT: 14.9 % (ref 9–15)
ETHANOL SERPL-MCNC: < 3 MG/DL (ref ?–10)
GFR SERPL CREATININE-BSD FRML MDRD: 108 ML/MIN (ref 90–?)
GLUCOSE SERPL-MCNC: 110 MG/DL (ref 70–99)
HCT VFR BLD AUTO: 39 % (ref 36–54)
HGB BLD-MCNC: 13 G/DL (ref 14–18)
LYMPHOCYTES # BLD AUTO: 0.9 K/UL (ref 1–5.5)
LYMPHOCYTES NFR BLD AUTO: 10.7 % (ref 20.5–51.5)
MCH RBC QN AUTO: 28 PG (ref 27–31)
MCHC RBC AUTO-ENTMCNC: 33 % (ref 32–36)
MCV RBC AUTO: 85 FL (ref 79–98)
MONOCYTES # BLD MANUAL: 0.7 K/UL (ref 0–1)
MONOCYTES # BLD MANUAL: 9 % (ref 1.7–9.3)
NEUTROPHILS # BLD AUTO: 6.6 K/UL (ref 1.8–7.7)
NEUTROPHILS NFR BLD AUTO: 80 % (ref 40–70)
PLATELET # BLD AUTO: 337 K/UL (ref 130–430)
POTASSIUM SERPL-SCNC: 4.2 MMOL/L (ref 3.5–5.1)
RBC # BLD AUTO: 4.6 MIL/UL (ref 4.2–6.2)
SODIUM SERPLBLD-SCNC: 142 MMOL/L (ref 136–145)
WBC # BLD AUTO: 8.2 K/UL (ref 4.8–10.8)

## 2021-12-20 PROCEDURE — G0480 DRUG TEST DEF 1-7 CLASSES: HCPCS

## 2021-12-20 PROCEDURE — 36415 COLL VENOUS BLD VENIPUNCTURE: CPT

## 2021-12-20 PROCEDURE — 93005 ELECTROCARDIOGRAM TRACING: CPT

## 2021-12-20 PROCEDURE — G0481 DRUG TEST DEF 8-14 CLASSES: HCPCS

## 2021-12-20 PROCEDURE — 99284 EMERGENCY DEPT VISIT MOD MDM: CPT

## 2021-12-20 PROCEDURE — 85025 COMPLETE CBC W/AUTO DIFF WBC: CPT

## 2021-12-20 PROCEDURE — 80053 COMPREHEN METABOLIC PANEL: CPT

## 2021-12-20 PROCEDURE — G0482 DRUG TEST DEF 15-21 CLASSES: HCPCS
